# Patient Record
Sex: FEMALE | Race: BLACK OR AFRICAN AMERICAN | NOT HISPANIC OR LATINO
[De-identification: names, ages, dates, MRNs, and addresses within clinical notes are randomized per-mention and may not be internally consistent; named-entity substitution may affect disease eponyms.]

---

## 2024-03-22 PROBLEM — Z00.00 ENCOUNTER FOR PREVENTIVE HEALTH EXAMINATION: Status: ACTIVE | Noted: 2024-03-22

## 2024-04-03 ENCOUNTER — APPOINTMENT (OUTPATIENT)
Dept: ORTHOPEDIC SURGERY | Facility: CLINIC | Age: 19
End: 2024-04-03
Payer: COMMERCIAL

## 2024-04-03 PROCEDURE — 99203 OFFICE O/P NEW LOW 30 MIN: CPT

## 2024-04-03 PROCEDURE — 73564 X-RAY EXAM KNEE 4 OR MORE: CPT | Mod: RT

## 2024-04-03 NOTE — DISCUSSION/SUMMARY
[de-identified] : modify activities use elastic sleeve try OTC meds ice as needed try topical lidocaine reviewed current medications used by this patient  RE:  OSWALD NELSON   Acct #- [id number]   Attention:  Nurse Reviewer /Medical Director    Based on my patient's condition, I strongly believe that the MRI R knee is medically.necessary.   The patient has failed oral meds, injections and PT and conservative treatment in combination or by themselves and therefore needs the MRI.   The MRI will dictate further treatment t recommendations.

## 2024-04-03 NOTE — HISTORY OF PRESENT ILLNESS
[Right Leg] : right leg [Gradual] : gradual [6] : 6 [5] : 5 [Stabbing] : stabbing [Intermittent] : intermittent [Rest] : rest [Exercising] : exercising [Full time] : Work status: full time [de-identified] : for about 2 years, banged the knee at that time and swelled up, pain peris, OTCs help, worse when walks, occasionally swells [FreeTextEntry5] : Pt initially fell about 2 years and hit her knee on a tree root and has been having gradual wrosening of right knee pain since, pt has pain with walking and has relief wearing a knee brace  [] : Post Surgical Visit: no [de-identified] : none  [de-identified] :

## 2024-04-03 NOTE — PHYSICAL EXAM
[5___] : quadriceps 5[unfilled]/5 [Positive] : positive Verónica [] : mildly antalgic [Right] : right knee [All Views] : anteroposterior, lateral, skyline, and anteroposterior standing [There are no fractures, subluxations or dislocations. No significant abnormalities are seen] : There are no fractures, subluxations or dislocations. No significant abnormalities are seen

## 2024-04-10 ENCOUNTER — APPOINTMENT (OUTPATIENT)
Dept: MRI IMAGING | Facility: CLINIC | Age: 19
End: 2024-04-10
Payer: COMMERCIAL

## 2024-04-10 PROCEDURE — 73721 MRI JNT OF LWR EXTRE W/O DYE: CPT | Mod: RT

## 2024-04-17 ENCOUNTER — APPOINTMENT (OUTPATIENT)
Dept: ORTHOPEDIC SURGERY | Facility: CLINIC | Age: 19
End: 2024-04-17
Payer: COMMERCIAL

## 2024-04-17 DIAGNOSIS — M70.51 OTHER BURSITIS OF KNEE, RIGHT KNEE: ICD-10-CM

## 2024-04-17 PROCEDURE — 20611 DRAIN/INJ JOINT/BURSA W/US: CPT | Mod: RT

## 2024-04-17 PROCEDURE — J3490M: CUSTOM

## 2024-04-17 PROCEDURE — 99213 OFFICE O/P EST LOW 20 MIN: CPT | Mod: 25

## 2024-04-17 RX ORDER — MELOXICAM 15 MG/1
15 TABLET ORAL
Qty: 21 | Refills: 1 | Status: ACTIVE | COMMUNITY
Start: 2024-04-17 | End: 1900-01-01

## 2024-04-17 NOTE — PROCEDURE
[FreeTextEntry3] : Large Joint Injection / Aspiration: Celestone, Lidocaine, Marcaine and Guidance Ultrasound Large Joint Injection was performed because of pain and inflammation. Anesthesia: ethyl chloride sprayed topically..  Celestone: An injection of Celestone 12 mg , 2 cc. Needle size: 22 gauge ,  Lidocaine: 3 cc.  Marcaine: 3 cc.   Medication was injected in the right knee. Patient has tried OTC's including aspirin, Ibuprofen, Aleve etc or prescription NSAIDS, and/or exercises at home and/ or physical therapy without satisfactory response and Patient has decreased mobility in the joint. After verbal consent using sterile preparation and technique. The risks, benefits, and alternatives to cortisone injection were explained in full to the patient. Risks outlined include but are not limited to infection, sepsis, bleeding, scarring, skin discoloration, temporary increase in pain, syncopal episode, failure to resolve symptoms, allergic reaction, symptom recurrence, and elevation of blood sugar in diabetics. Patient understood the risks. All questions were answered. After discussion of options, patient requested an injection. Oral informed consent was obtained and sterile prep was done of the injection site. Sterile technique was utilized for the procedure including the preparation of the solutions used for the injection. Patient tolerated the procedure well. Advised to ice the injection site this evening. Prep with betadine locally to site. Sterile technique used. Patient tolerated procedure well. Post Procedure Instructions: Patient was advised to call if redness, pain, or fever occur and apply ice for 15 min. out of every hour for the next 12-24 hours as tolerated. patient was advised to rest the joint(s) for 2 days.   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   Ultrasound guided injection was performed of the knee, visualization of the needle and placement of injection was performed without complication.

## 2024-04-17 NOTE — HISTORY OF PRESENT ILLNESS
[Right Leg] : right leg [Gradual] : gradual [6] : 6 [5] : 5 [Stabbing] : stabbing [Intermittent] : intermittent [Rest] : rest [Exercising] : exercising [Full time] : Work status: full time [de-identified] : for about 2 years, banged the knee at that time and swelled up, pain persists, OTCs help, worse when walks, occasionally swells, had mRI [] : Post Surgical Visit: no [FreeTextEntry5] : Pt initially fell about 2 years and hit her knee on a tree root and has been having gradual wrosening of right knee pain since, pt has pain with walking and has relief wearing a knee brace  [de-identified] : none  [de-identified] :   No adenopathy or splenomegaly. No cervical lymphadenopathy.

## 2024-04-17 NOTE — DISCUSSION/SUMMARY
[de-identified] : modify activities use elastic sleeve try OTC meds ice as needed try topical lidocaine reviewed current medications used by this patient  04/17/2024    RE:  NANCICHYNAPATRIA DUNCANFORD   Acct #- [id number]    Attention:  Nurse Reviewer /Medical Director  I am writing this letter as a medical necessity for PT program. Patient has tried analgesics, non-steroid anti-inflammatory agents,  hot or cold compresses,injections of corticosteroids, etc)  which in combination or by themselves has not worked. Based on my patient's condition, I strongly believe that the PT is medically needed.   Thank you for your time and consideration.     will try csi

## 2024-04-17 NOTE — DATA REVIEWED
[MRI] : MRI [Right] : of the right [Knee] : knee [Report was reviewed and noted in the chart] : The report was reviewed and noted in the chart [I reviewed the films/CD and agree] : I reviewed the films/CD and agree [FreeTextEntry1] : mcl sprain, synovitis

## 2024-05-29 ENCOUNTER — APPOINTMENT (OUTPATIENT)
Dept: ORTHOPEDIC SURGERY | Facility: CLINIC | Age: 19
End: 2024-05-29